# Patient Record
Sex: FEMALE | Race: WHITE
[De-identification: names, ages, dates, MRNs, and addresses within clinical notes are randomized per-mention and may not be internally consistent; named-entity substitution may affect disease eponyms.]

---

## 2017-05-10 ENCOUNTER — APPOINTMENT (OUTPATIENT)
Dept: BREAST CENTER | Facility: CLINIC | Age: 47
End: 2017-05-10

## 2017-05-10 VITALS
DIASTOLIC BLOOD PRESSURE: 70 MMHG | SYSTOLIC BLOOD PRESSURE: 110 MMHG | HEART RATE: 74 BPM | WEIGHT: 130 LBS | HEIGHT: 62 IN | BODY MASS INDEX: 23.92 KG/M2

## 2017-05-10 DIAGNOSIS — Z85.3 ENCOUNTER FOR FOLLOW-UP EXAMINATION AFTER COMPLETED TREATMENT FOR MALIGNANT NEOPLASM: ICD-10-CM

## 2017-05-10 DIAGNOSIS — Z08 ENCOUNTER FOR FOLLOW-UP EXAMINATION AFTER COMPLETED TREATMENT FOR MALIGNANT NEOPLASM: ICD-10-CM

## 2017-05-11 ENCOUNTER — TRANSCRIPTION ENCOUNTER (OUTPATIENT)
Age: 47
End: 2017-05-11

## 2018-06-01 ENCOUNTER — APPOINTMENT (OUTPATIENT)
Dept: BREAST CENTER | Facility: CLINIC | Age: 48
End: 2018-06-01
Payer: COMMERCIAL

## 2018-06-22 ENCOUNTER — APPOINTMENT (OUTPATIENT)
Dept: BREAST CENTER | Facility: CLINIC | Age: 48
End: 2018-06-22
Payer: COMMERCIAL

## 2018-06-22 VITALS
WEIGHT: 135 LBS | BODY MASS INDEX: 24.84 KG/M2 | HEART RATE: 68 BPM | DIASTOLIC BLOOD PRESSURE: 72 MMHG | SYSTOLIC BLOOD PRESSURE: 108 MMHG | HEIGHT: 62 IN

## 2018-06-22 PROCEDURE — 99213 OFFICE O/P EST LOW 20 MIN: CPT

## 2018-06-22 RX ORDER — OMEPRAZOLE 20 MG/1
20 CAPSULE, DELAYED RELEASE ORAL
Qty: 60 | Refills: 0 | Status: DISCONTINUED | COMMUNITY
Start: 2018-05-09

## 2018-06-22 RX ORDER — PREDNISONE 50 MG/1
50 TABLET ORAL
Qty: 5 | Refills: 0 | Status: DISCONTINUED | COMMUNITY
Start: 2018-02-09

## 2018-06-22 RX ORDER — FLUTICASONE PROPIONATE 50 UG/1
50 SPRAY, METERED NASAL
Qty: 16 | Refills: 0 | Status: DISCONTINUED | COMMUNITY
Start: 2018-03-21

## 2018-06-22 RX ORDER — BROMPHENIRAMINE MALEATE, PSEUDOEPHEDRINE HYDROCHLORIDE, 2; 30; 10 MG/5ML; MG/5ML; MG/5ML
30-2-10 SYRUP ORAL
Qty: 240 | Refills: 0 | Status: DISCONTINUED | COMMUNITY
Start: 2018-03-21

## 2018-06-22 RX ORDER — OSELTAMIVIR PHOSPHATE 75 MG/1
75 CAPSULE ORAL
Qty: 10 | Refills: 0 | Status: DISCONTINUED | COMMUNITY
Start: 2018-02-05

## 2018-07-23 ENCOUNTER — APPOINTMENT (OUTPATIENT)
Dept: OBGYN | Facility: CLINIC | Age: 48
End: 2018-07-23
Payer: COMMERCIAL

## 2018-07-23 ENCOUNTER — ASOB RESULT (OUTPATIENT)
Age: 48
End: 2018-07-23

## 2018-07-23 PROCEDURE — 76857 US EXAM PELVIC LIMITED: CPT

## 2018-07-23 PROCEDURE — 76830 TRANSVAGINAL US NON-OB: CPT

## 2019-03-06 ENCOUNTER — TRANSCRIPTION ENCOUNTER (OUTPATIENT)
Age: 49
End: 2019-03-06

## 2019-10-02 ENCOUNTER — APPOINTMENT (OUTPATIENT)
Dept: BREAST CENTER | Facility: CLINIC | Age: 49
End: 2019-10-02
Payer: COMMERCIAL

## 2019-10-02 VITALS
DIASTOLIC BLOOD PRESSURE: 68 MMHG | WEIGHT: 140 LBS | HEIGHT: 62 IN | SYSTOLIC BLOOD PRESSURE: 107 MMHG | HEART RATE: 66 BPM | BODY MASS INDEX: 25.76 KG/M2

## 2019-10-02 PROCEDURE — 99213 OFFICE O/P EST LOW 20 MIN: CPT

## 2019-10-02 NOTE — HISTORY OF PRESENT ILLNESS
[FreeTextEntry1] : The patient has a history of stage I right breast cancer ( poorly diff invasive ductal, ER>90% SC>90% Her 2 2+, equivocal on FISH 2.2) diagnosed in May 2010 at the age of 39. She received preoperative chemotherapy with AC x4 and TH weekly x12. She then underwent bilateral mastectomies, sentinel lymph node biopsies and ADARSH flap reconstruction in December 2012. There was a residual 2 cm area of invasive cancer with 4 negative SLNs.\par \par She also completed a year of Herceptin in addition to neratinib on a clinical trial that was completed in September 2012. (Oral daily drug versus placebo). She continues on tamoxifen. She hasn't had a menses since 2012, although her estrogen levels are still in the premenopausal range.  Dr. York had spoken to her about Lupron and an aromatase inhibitor, but she feels well and does not want to risk a change.\par \par  She noticed a small white spot under her right axilla a few months ago and showed it to Dr. York and a Dermatologist.  She was told it "was nothing." \par \par

## 2019-10-02 NOTE — CONSULT LETTER
[Dear  ___] : Dear  [unfilled], [Courtesy Letter:] : I had the pleasure of seeing your patient, [unfilled], in my office today. [Sincerely,] : Sincerely, [DrJass  ___] : Dr. AGUILAR [___] : [unfilled]

## 2019-10-02 NOTE — PAST MEDICAL HISTORY
[Menarche Age ____] : age at menarche was [unfilled] [Total Preg ___] : G[unfilled] [Live Births ___] : P[unfilled]  [Age At Live Birth ___] : Age at live birth: [unfilled] [FreeTextEntry2] : son [FreeTextEntry7] : x 10 years, Nuvaring x 2-3 years

## 2019-10-02 NOTE — PHYSICAL EXAM
[Sclera nonicteric] : sclera nonicteric [Supple] : supple [No Supraclavicular Adenopathy] : no supraclavicular adenopathy [No Cervical Adenopathy] : no cervical adenopathy [No Thyromegaly] : no thyromegaly [Clear to Auscultation Bilat] : clear to auscultation bilaterally [Examined in the supine and seated position] : examined in the supine and seated position [No dominant masses] : no dominant masses in right breast  [No dominant masses] : no dominant masses left breast [No Axillary Lymphadenopathy] : no left axillary lymphadenopathy [Soft] : abdomen soft [Not Tender] : non-tender [de-identified] : Reconstruction with MARCELLO. [de-identified] : Reconstruction with MARCELLO. [de-identified] : Small scar.  2-3 mm superficial nodule with white appearance and skin punctum distil medial axillary skin.

## 2020-01-22 ENCOUNTER — LABORATORY RESULT (OUTPATIENT)
Age: 50
End: 2020-01-22

## 2020-01-22 ENCOUNTER — APPOINTMENT (OUTPATIENT)
Dept: BREAST CENTER | Facility: CLINIC | Age: 50
End: 2020-01-22
Payer: COMMERCIAL

## 2020-01-22 VITALS
DIASTOLIC BLOOD PRESSURE: 72 MMHG | HEART RATE: 67 BPM | BODY MASS INDEX: 25.21 KG/M2 | WEIGHT: 137 LBS | HEIGHT: 62 IN | SYSTOLIC BLOOD PRESSURE: 114 MMHG

## 2020-01-22 PROCEDURE — 24065 BIOPSY ARM/ELBOW SOFT TISSUE: CPT

## 2020-01-22 PROCEDURE — 99213 OFFICE O/P EST LOW 20 MIN: CPT | Mod: 25

## 2020-01-22 NOTE — PROCEDURE
[FreeTextEntry1] : Right axillary biopsy [FreeTextEntry2] : Right axillary lesion [FreeTextEntry3] : The right axilla was prepped and draped.  Local 1% lidocaine was infiltrated.  An elliptical excision of skin and the small nodule was performed.  The skin was closed with 2 #4-0 nylon sutures.  Steristrips and a sterile dressing were placed.  The patient tolerated the procedure well.

## 2020-01-22 NOTE — HISTORY OF PRESENT ILLNESS
[FreeTextEntry1] : The patient has a history of stage I right breast cancer ( poorly diff invasive ductal, ER>90% DE>90% Her 2 2+, equivocal on FISH 2.2) diagnosed in May 2010 at the age of 39. She received preoperative chemotherapy with AC x4 and TH weekly x12. She then underwent bilateral mastectomies, sentinel lymph node biopsies and ADARSH flap reconstruction in December 2012. There was a residual 2 cm area of invasive cancer with 4 negative SLNs.\par \par She also completed a year of Herceptin in addition to neratinib on a clinical trial that was completed in September 2012. (Oral daily drug versus placebo). She continues on tamoxifen. She hasn't had a menses since 2012, although her estrogen levels are still in the premenopausal range.  Dr. York had spoken to her about Lupron and an aromatase inhibitor, but she feels well and does not want to risk a change.\par \par  She noticed a small white spot under her right axilla and was seen in October.  She does not think it has changed.\par \par

## 2020-01-22 NOTE — PAST MEDICAL HISTORY
[Menarche Age ____] : age at menarche was [unfilled] [Live Births ___] : P[unfilled]  [Age At Live Birth ___] : Age at live birth: [unfilled] [Total Preg ___] : G[unfilled] [FreeTextEntry2] : son [FreeTextEntry7] : x 10 years, Nuvaring x 2-3 years

## 2020-01-22 NOTE — CONSULT LETTER
[Sincerely,] : Sincerely, [Dear  ___] : Dear  [unfilled], [Courtesy Letter:] : I had the pleasure of seeing your patient, [unfilled], in my office today. [DrJass  ___] : Dr. AGUILAR [___] : [unfilled]

## 2020-01-22 NOTE — PHYSICAL EXAM
[Supple] : supple [Sclera nonicteric] : sclera nonicteric [No Supraclavicular Adenopathy] : no supraclavicular adenopathy [No Thyromegaly] : no thyromegaly [No Cervical Adenopathy] : no cervical adenopathy [Clear to Auscultation Bilat] : clear to auscultation bilaterally [No dominant masses] : no dominant masses in right breast  [Examined in the supine and seated position] : examined in the supine and seated position [No dominant masses] : no dominant masses left breast [No Axillary Lymphadenopathy] : no right axillary lymphadenopathy [Not Tender] : non-tender [Soft] : abdomen soft [de-identified] : Reconstruction with MARCELLO. [de-identified] : Reconstruction with MARCELLO. [de-identified] : Small scar.  2-3 mm superficial nodule with white appearance and skin punctum distil medial axillary skin slightly increased.

## 2020-01-29 ENCOUNTER — APPOINTMENT (OUTPATIENT)
Dept: BREAST CENTER | Facility: CLINIC | Age: 50
End: 2020-01-29
Payer: COMMERCIAL

## 2020-01-29 VITALS
DIASTOLIC BLOOD PRESSURE: 78 MMHG | HEART RATE: 64 BPM | SYSTOLIC BLOOD PRESSURE: 110 MMHG | BODY MASS INDEX: 25.21 KG/M2 | WEIGHT: 137 LBS | HEIGHT: 62 IN

## 2020-01-29 PROCEDURE — 99024 POSTOP FOLLOW-UP VISIT: CPT

## 2020-01-29 NOTE — CONSULT LETTER
[Dear  ___] : Dear  [unfilled], [Courtesy Letter:] : I had the pleasure of seeing your patient, [unfilled], in my office today. [DrJass  ___] : Dr. AGUILAR [Sincerely,] : Sincerely, [___] : [unfilled]

## 2020-01-29 NOTE — PAST MEDICAL HISTORY
[Menarche Age ____] : age at menarche was [unfilled] [Live Births ___] : P[unfilled]  [Total Preg ___] : G[unfilled] [Age At Live Birth ___] : Age at live birth: [unfilled] [FreeTextEntry7] : x 10 years, Nuvaring x 2-3 years [FreeTextEntry2] : son

## 2020-01-29 NOTE — HISTORY OF PRESENT ILLNESS
[FreeTextEntry1] : The patient has a history of stage I right breast cancer ( poorly diff invasive ductal, ER>90% AZ>90% Her 2 2+, equivocal on FISH 2.2) diagnosed in May 2010 at the age of 39. She received preoperative chemotherapy with AC x4 and TH weekly x12. She then underwent bilateral mastectomies, sentinel lymph node biopsies and ADARSH flap reconstruction in December 2012. There was a residual 2 cm area of invasive cancer with 4 negative SLNs.\par \par She also completed a year of Herceptin in addition to neratinib on a clinical trial that was completed in September 2012. (Oral daily drug versus placebo). She continues on tamoxifen. She hasn't had a menses since 2012, although her estrogen levels are still in the premenopausal range.  Dr. York had spoken to her about Lupron and an aromatase inhibitor, but she feels well and does not want to risk a change.\par \par She noticed a small white spot under her right axilla and was seen in October.  She did not think it had changed, but options were discussed and it was excised last week. It was a bit sore.\par \par

## 2021-03-23 ENCOUNTER — APPOINTMENT (OUTPATIENT)
Dept: BREAST CENTER | Facility: CLINIC | Age: 51
End: 2021-03-23
Payer: COMMERCIAL

## 2021-03-23 VITALS
BODY MASS INDEX: 24.84 KG/M2 | SYSTOLIC BLOOD PRESSURE: 101 MMHG | WEIGHT: 135 LBS | HEART RATE: 77 BPM | HEIGHT: 62 IN | DIASTOLIC BLOOD PRESSURE: 62 MMHG

## 2021-03-23 DIAGNOSIS — Z23 ENCOUNTER FOR IMMUNIZATION: ICD-10-CM

## 2021-03-23 DIAGNOSIS — Z09 ENCOUNTER FOR FOLLOW-UP EXAMINATION AFTER COMPLETED TREATMENT FOR CONDITIONS OTHER THAN MALIGNANT NEOPLASM: ICD-10-CM

## 2021-03-23 DIAGNOSIS — L98.9 DISORDER OF THE SKIN AND SUBCUTANEOUS TISSUE, UNSPECIFIED: ICD-10-CM

## 2021-03-23 DIAGNOSIS — Z85.3 PERSONAL HISTORY OF MALIGNANT NEOPLASM OF BREAST: ICD-10-CM

## 2021-03-23 PROCEDURE — 99213 OFFICE O/P EST LOW 20 MIN: CPT

## 2021-03-23 PROCEDURE — 99072 ADDL SUPL MATRL&STAF TM PHE: CPT

## 2021-03-23 NOTE — HISTORY OF PRESENT ILLNESS
[FreeTextEntry1] : The patient has a history of stage I right breast cancer ( poorly diff invasive ductal, ER>90% AZ>90% Her 2 2+, equivocal on FISH 2.2) diagnosed in May 2010 at the age of 39. She received preoperative chemotherapy with AC x4 and TH weekly x12 (Dr. York). She then underwent bilateral mastectomies, sentinel lymph node biopsies and ADARSH flap reconstruction in December 2012. There was a residual 2 cm area of invasive cancer with 4 negative SLNs.\par \par She also completed a year of Herceptin in addition to neratinib on a clinical trial that was completed in September 2012. (Oral daily drug versus placebo). She completed tamoxifen in the fall of 2020. She had a menses soon after and had an ovarian cyst that subsequently resolved.\par \par She has no current complaints.\par

## 2021-03-23 NOTE — PHYSICAL EXAM
[Sclera nonicteric] : sclera nonicteric [Supple] : supple [No Supraclavicular Adenopathy] : no supraclavicular adenopathy [No Cervical Adenopathy] : no cervical adenopathy [No Thyromegaly] : no thyromegaly [Clear to Auscultation Bilat] : clear to auscultation bilaterally [Examined in the supine and seated position] : examined in the supine and seated position [No dominant masses] : no dominant masses in right breast  [No dominant masses] : no dominant masses left breast [No Axillary Lymphadenopathy] : no left axillary lymphadenopathy [Soft] : abdomen soft [Not Tender] : non-tender [de-identified] : Reconstruction with MARCELLO. [de-identified] : Reconstruction with MARCELLO. [de-identified] : Small scar.

## 2025-07-25 ENCOUNTER — APPOINTMENT (OUTPATIENT)
Dept: ORTHOPEDIC SURGERY | Facility: CLINIC | Age: 55
End: 2025-07-25
Payer: COMMERCIAL

## 2025-07-25 DIAGNOSIS — M70.61 TROCHANTERIC BURSITIS, RIGHT HIP: ICD-10-CM

## 2025-07-25 DIAGNOSIS — M54.16 RADICULOPATHY, LUMBAR REGION: ICD-10-CM

## 2025-07-25 DIAGNOSIS — M53.3 SACROCOCCYGEAL DISORDERS, NOT ELSEWHERE CLASSIFIED: ICD-10-CM

## 2025-07-25 DIAGNOSIS — M70.62 TROCHANTERIC BURSITIS, RIGHT HIP: ICD-10-CM

## 2025-07-25 PROCEDURE — 72170 X-RAY EXAM OF PELVIS: CPT

## 2025-07-25 PROCEDURE — 72110 X-RAY EXAM L-2 SPINE 4/>VWS: CPT

## 2025-07-25 PROCEDURE — 99204 OFFICE O/P NEW MOD 45 MIN: CPT

## 2025-07-25 RX ORDER — ESCITALOPRAM OXALATE 5 MG/1
5 TABLET, FILM COATED ORAL
Refills: 0 | Status: ACTIVE | COMMUNITY

## 2025-07-25 RX ORDER — MELOXICAM 15 MG/1
15 TABLET ORAL DAILY
Qty: 30 | Refills: 1 | Status: ACTIVE | COMMUNITY
Start: 2025-07-25 | End: 1900-01-01

## 2025-07-25 RX ORDER — BUSPIRONE HYDROCHLORIDE 7.5 MG/1
7.5 TABLET ORAL
Refills: 0 | Status: ACTIVE | COMMUNITY